# Patient Record
Sex: MALE | URBAN - METROPOLITAN AREA
[De-identification: names, ages, dates, MRNs, and addresses within clinical notes are randomized per-mention and may not be internally consistent; named-entity substitution may affect disease eponyms.]

---

## 2020-06-17 DIAGNOSIS — Z20.822 SUSPECTED COVID-19 VIRUS INFECTION: ICD-10-CM

## 2020-07-07 ENCOUNTER — OFFICE VISIT (OUTPATIENT)
Dept: URGENT CARE | Facility: CLINIC | Age: 55
End: 2020-07-07
Payer: COMMERCIAL

## 2020-07-07 VITALS
WEIGHT: 220 LBS | SYSTOLIC BLOOD PRESSURE: 184 MMHG | HEIGHT: 69 IN | DIASTOLIC BLOOD PRESSURE: 99 MMHG | HEART RATE: 85 BPM | RESPIRATION RATE: 16 BRPM | TEMPERATURE: 97 F | OXYGEN SATURATION: 97 % | BODY MASS INDEX: 32.58 KG/M2

## 2020-07-07 DIAGNOSIS — Z20.822 CLOSE EXPOSURE TO COVID-19 VIRUS: Primary | ICD-10-CM

## 2020-07-07 PROCEDURE — 99203 PR OFFICE/OUTPT VISIT, NEW, LEVL III, 30-44 MIN: ICD-10-PCS | Mod: S$GLB,,, | Performed by: NURSE PRACTITIONER

## 2020-07-07 PROCEDURE — U0003 INFECTIOUS AGENT DETECTION BY NUCLEIC ACID (DNA OR RNA); SEVERE ACUTE RESPIRATORY SYNDROME CORONAVIRUS 2 (SARS-COV-2) (CORONAVIRUS DISEASE [COVID-19]), AMPLIFIED PROBE TECHNIQUE, MAKING USE OF HIGH THROUGHPUT TECHNOLOGIES AS DESCRIBED BY CMS-2020-01-R: HCPCS

## 2020-07-07 PROCEDURE — 99203 OFFICE O/P NEW LOW 30 MIN: CPT | Mod: S$GLB,,, | Performed by: NURSE PRACTITIONER

## 2020-07-07 NOTE — PROGRESS NOTES
"Subjective:       Patient ID: Harvey Tracy is a 55 y.o. male.    Vitals:  height is 5' 9" (1.753 m) and weight is 99.8 kg (220 lb). His oral temperature is 97.1 °F (36.2 °C). His blood pressure is 184/99 (abnormal) and his pulse is 85. His respiration is 16 and oxygen saturation is 97%.     Chief Complaint: COVID-19 Concerns and Sore Throat    On rig, exposed to 2-3 people who had symptoms and now have tested + for covid, same sleeping quarters.    Sore Throat   This is a new problem. Episode onset: 3-4 days. The problem has been unchanged. Neither side of throat is experiencing more pain than the other. There has been no fever. The pain is at a severity of 4/10. The pain is mild. Associated symptoms include headaches (Frontal, nonradiating, mild in intensity). Pertinent negatives include no abdominal pain, congestion, coughing, diarrhea, drooling, ear discharge, ear pain, hoarse voice, plugged ear sensation, neck pain, shortness of breath, stridor, swollen glands, trouble swallowing or vomiting. He has had no exposure to strep or mono. He has tried nothing for the symptoms.       Constitution: Negative for chills, sweating, fatigue and fever.        No loss of taste and smell.   HENT: Positive for sore throat. Negative for ear pain, ear discharge, drooling, congestion, sinus pain, sinus pressure, trouble swallowing and voice change.    Neck: Negative for neck pain, neck stiffness and painful lymph nodes.   Cardiovascular: Negative for chest pain and sob on exertion.   Eyes: Negative for eye discharge, eye itching and eye redness.   Respiratory: Negative for chest tightness, cough, sputum production, bloody sputum, COPD, shortness of breath, stridor, wheezing and asthma.    Gastrointestinal: Negative for abdominal pain, nausea, vomiting and diarrhea.   Genitourinary: Negative for dysuria, frequency, urgency, urine decreased, flank pain, hematuria and history of kidney stones.   Musculoskeletal: Positive for " muscle ache. Negative for joint pain and joint swelling.   Skin: Negative for rash and erythema.   Allergic/Immunologic: Negative for seasonal allergies and asthma.   Neurological: Positive for headaches (Frontal, nonradiating, mild in intensity). Negative for light-headedness.   Hematologic/Lymphatic: Negative for swollen lymph nodes.       Objective:      Physical Exam   Constitutional: He is oriented to person, place, and time.  Non-toxic appearance. He does not appear ill. No distress.   HENT:   Head: Normocephalic.   Right Ear: External ear normal.   Left Ear: External ear normal.   Nose: Nose normal.   Mouth/Throat: Mucous membranes are moist. Posterior oropharyngeal erythema (Mild clear postnasal drainage) present. No oropharyngeal exudate.      Comments: Airway patent.  Normal phonation.  Symmetrical soft palate elevation.  No trismus or difficulty tolerating oral secretions.  Eyes: Conjunctivae are normal. Right eye exhibits no discharge. Left eye exhibits no discharge. No scleral icterus.   Neck: Normal range of motion. Neck supple. No neck rigidity.   Cardiovascular: Normal rate.   Pulmonary/Chest: Effort normal. No respiratory distress.    Comments: Normal respiratory rate, room air saturations.  Speaking in full sentences without difficulty.  No notable coughing.  No audible wheezing.    Abdominal: Normal appearance. There is no abdominal tenderness ( per patient).   Musculoskeletal: Normal range of motion.      Right lower leg: No edema.      Left lower leg: No edema.   Lymphadenopathy:     He has no cervical adenopathy.   Neurological: He is alert and oriented to person, place, and time. Coordination normal.   Skin: Skin is warm, dry, not diaphoretic and not pale. erythema  Psychiatric: His behavior is normal. Mood normal.   Nursing note and vitals reviewed.      Each patient to whom I provide medical services by telemedicine is:  (1) informed of the relationship between the physician and patient and  the respective role of any other health care provider with respect to management of the patient; and (2) notified that they may decline to receive medical services by telemedicine and may withdraw from such care at any time. Patient verbally consented to receive this service via voice-only telephone call.       This service was not originating from a related E/M service provided within the previous 7 days nor will  to an E/M service or procedure within the next 24 hours or my soonest available appointment.  Prevailing standard of care was able to be met in this audio and 5 min face to face visit with appropriate ppe.     Assessment:       1. Close Exposure to Covid-19 Virus        Plan:     Alert, nontoxic and in NAD. Afebrile.  Patient with no evidence of respiratory distress.  Patient with viral syndrome symptoms.  Will test for COVID.  Advised on COVID testing, signs and symptoms of COVID, symptomatic management at home, signs and symptoms to seek emergency care, CDC quarantine guidelines for COVID.  Patient verbalized understanding and agreement treatment plan.      Close Exposure to Covid-19 Virus  -     COVID-19 Routine Screening         Patient Instructions     Elevated Blood Pressure  Your blood pressure was elevated during your visit to the urgent care today.  It was not so high that immediate care was needed, but it is recommended that you monitor your blood pressure over the next week or two to make sure that it is not staying elevated.  If you are on blood pressure medication currently, continue as already prescribed. Please have your blood pressure taken 2-3 times daily at different times of the day.  Keep a log of these blood pressure readings and take it with you to see your Primary Care Physician.  Bring today's discharge papers as well to your follow up appointment. If your blood pressure is consistently above 140/90, you should follow-up with your PCP without delay. If you develop chest  "pain, shortness of breath, dizziness, vision changes, or any other concerning symptoms, you should seek immediate care in the Emergency Department.          Viral Syndrome (Adult)  A viral illness may cause a number of symptoms. The symptoms depend on the part of the body that the virus affects. If it settles in your nose, throat, and lungs, it may cause cough, sore throat, congestion, and sometimes headache. If it settles in your stomach and intestinal tract, it may cause vomiting and diarrhea. Sometimes it causes vague symptoms like "aching all over," feeling tired, loss of appetite, or fever.  A viral illness usually lasts 1 to 2 weeks, but sometimes it lasts longer. In some cases, a more serious infection can look like a viral syndrome in the first few days of the illness. You may need another exam and additional tests to know the difference. Watch for the warning signs listed below.  Home care  Follow these guidelines for taking care of yourself at home:  If symptoms are severe, rest at home for the first 2 to 3 days.  Stay away from cigarette smoke - both your smoke and the smoke from others.  You may use over-the-counter acetaminophen or ibuprofen for fever, muscle aching, and headache, unless another medicine was prescribed for this. If you have chronic liver or kidney disease or ever had a stomach ulcer or GI bleeding, talk with your doctor before using these medicines. No one who is younger than 18 and ill with a fever should take aspirin. It may cause severe disease or death.  Your appetite may be poor, so a light diet is fine. Avoid dehydration by drinking 8 to 12 8-ounce glasses of fluids each day. This may include water; orange juice; lemonade; apple, grape, and cranberry juice; clear fruit drinks; electrolyte replacement and sports drinks; and decaffeinated teas and coffee. If you have been diagnosed with a kidney disease, ask your doctor how much and what types of fluids you should drink to prevent " dehydration. If you have kidney disease, drinking too much fluid can cause it build up in the your body and be dangerous to your health.  Over-the-counter remedies won't shorten the length of the illness but may be helpful for cough, sore throat; and nasal and sinus congestion. Don't use decongestants if you have high blood pressure.  Follow-up care  Follow up with your healthcare provider if you do not improve over the next week.  Call 911  Get emergency medical care if any of the following occur:  Convulsion  Feeling weak, dizzy, or like you are going to faint  Chest pain, shortness of breath, wheezing, or difficulty breathing  When to seek medical advice  Call your healthcare provider right away if any of these occur:  Cough with lots of colored sputum (mucus) or blood in your sputum  Chest pain, shortness of breath, wheezing, or difficulty breathing  Severe headache; face, neck, or ear pain  Severe, constant pain in the lower right side of your belly (abdominal)  Continued vomiting (cant keep liquids down)  Frequent diarrhea (more than 5 times a day); blood (red or black color) or mucus in diarrhea  Feeling weak, dizzy, or like you are going to faint  Extreme thirst  Fever of 100.4°F (38°C) or higher, or as directed by your healthcare provider  Date Last Reviewed: 9/25/2015  © 9271-2639 [employer]. [address] [place] [address]. All rights reserved. This information is not intended as a substitute for professional medical care. Always follow your healthcare professional's instructions.      Instructions for Patients with Confirmed or Suspected COVID-19    If you are awaiting your test result, you will either be called or it will be released to the patient portal.  If you have any questions about your test, please visit [email or web address] or call our COVID-19 information line at [phone].      Preventing the Spread of Coronavirus Disease 2019 (COVID-19) in Homes and  Residential Communities -- Patients     Prevention steps for people with confirmed or suspected COVID-19 (including persons under investigation) who do not need to be hospitalized and people with confirmed COVID-19 who were hospitalized and determined to be medically stable to go home.     Stay home except to get medical care.   Separate yourself from other people and animals in your home.   Call ahead before visiting your doctor.   Wear a face mask.   Cover your coughs and sneezes.   Clean your hands often.   Avoid sharing personal household items.   Clean all high-touch surfaces every day.   Monitor your symptoms. Seek prompt medical attention if your illness is worsening (e.g., difficulty breathing). Before seeking care, call your healthcare provider.   If you have a medical emergency and must call 911, notify the dispatcher that you have or are being evaluated for COVID-19. If possible, put on a face mask before emergency medical services arrive.   Use the following symptom-based strategy to return to normal activity following a suspected or confirmed case of COVID-19. Continue isolation until:   At least 3 days (72 hours) have passed since recovery defined as resolution of fever without the use of fever-reducing medications and improvement in respiratory symptoms (e.g. cough, shortness of breath), and   At least 10 days have passed since symptoms first appeared.     Precautions for household members, intimate partners and caregivers in a non-healthcare setting of a patient with symptomatic laboratory-confirmed COVID-19 or a patient under investigation.     Household members, intimate partners and caregivers in a non-healthcare setting may have close contact with a person with symptomatic, laboratory-confirmed COVID-19 or a person under investigation. Close contacts should monitor their health; they should call their healthcare provider right away if they develop symptoms suggestive of COVID-19 (e.g., fever,  cough, shortness of breath). Close contacts should also follow these recommendations:     Stay home for the duration of the time recommended by healthcare provider, except to get medical care. Separate yourself from other people and animals in the home.  Monitor the patients symptoms. If the patient is getting sicker, call his or her healthcare provider. If the patient has a medical emergency and you need to call 911, notify the dispatch personnel that the patient has or is being evaluated for COVID-19.   Wear a facemask when around other people such as sharing a room or vehicle and before entering a healthcare provider's office.  Cover coughs and sneezes with a tissue. Throw used tissues in a lined trash can immediately and wash hands.  Clean hands often with soap and water for at least 20 seconds or with an alcohol-based hand , rubbing hands together until they feel dry. Avoid touching your eyes, nose, and mouth with unwashed hands.  Clean all high-touch; surfaces every day, including counters, tabletops, doorknobs, bathroom fixtures, toilets, phones, keyboards, tablets, bedside tables, etc. Use a household cleaning spray or wipe according to label instructions.  Avoid sharing personal household items such as dishes, drinking glasses, cups, towels, bedding, etc. After these items are used, they should be washed thoroughly with soap and water.  Use the following symptom-based strategy to return to normal activity following a suspected or confirmed case of COVID-19. Continue isolation until:   At least 3 days (72 hours) have passed since recovery defined as resolution of fever without the use of fever-reducing medications and improvement in respiratory symptoms (e.g. cough, shortness of breath), and   At least 10 days have passed since symptoms first appeared.  ·   ·   · Follow up with your primary care in 2-5 days if symptoms have not improved, or you may return here.  · If you were referred to a  specialist, please follow up with that specialty.  · If you were prescribed antibiotics, please take them to completion.  · If you were prescribed a narcotic or any medication with sedative effects, do not drive or operate heavy equipment or machinery while taking these medications.  · You must understand that you have received treatment at an Urgent Care facility only, and that you may be released before all of your medical problems are known or treated. Urgent Care facilities are not equipped to handle life threatening emergencies. It is recommended that you go to an Emergency Department for further evaluation of worsening or concerning symptoms, or possibly life threatening conditions as discussed.                                        If you  smoke, please stop smoking

## 2020-07-07 NOTE — LETTER
[address] [facility] [address] ? [place], [address] ? Phone [phone] ? Fax [phone]           Return to Work Status    Patient: Harvey Tracy  YOB: 1965   Date: 07/07/2020      [facility] has adopted CDCs time-based return to work strategy for persons with confirmed or suspected COVID19. [facility] does not recommend using a test-based strategy for returning to work after COVID-19 infection. [facility] has reported prolonged positive test results without evidence of infectiousness. At this time, positive specimens capable of producing disease have not been isolated more than 9 days after onset of illness.   Symptomatic persons with confirmed COVID-19 or suspected COVID-19 can return to work after:    At least 3 days (72 hours) have passed since recovery defined as resolution of fever without the use of fever-reducing medications AND improvement in respiratory symptoms (e.g., cough, shortness of breath)    AND, at least 10 days have passed since symptoms first appeared  Asymptomatic persons with confirmed COVID-19 can return to work after:   At least 10 days have passed since the positive laboratory test and the person remains asymptomatic.  More information about the science behind the symptom-based return to work can be found at: https://www.cdc.gov/coronavirus/2019-ncov/community/gaubxzhp-fgcpukbnpda-nybglejmo.html    Sincerely,    Nu Miller NP

## 2020-07-07 NOTE — PATIENT INSTRUCTIONS
"  Elevated Blood Pressure  Your blood pressure was elevated during your visit to the urgent care today.  It was not so high that immediate care was needed, but it is recommended that you monitor your blood pressure over the next week or two to make sure that it is not staying elevated.  If you are on blood pressure medication currently, continue as already prescribed. Please have your blood pressure taken 2-3 times daily at different times of the day.  Keep a log of these blood pressure readings and take it with you to see your Primary Care Physician.  Bring today's discharge papers as well to your follow up appointment. If your blood pressure is consistently above 140/90, you should follow-up with your PCP without delay. If you develop chest pain, shortness of breath, dizziness, vision changes, or any other concerning symptoms, you should seek immediate care in the Emergency Department.          Viral Syndrome (Adult)  A viral illness may cause a number of symptoms. The symptoms depend on the part of the body that the virus affects. If it settles in your nose, throat, and lungs, it may cause cough, sore throat, congestion, and sometimes headache. If it settles in your stomach and intestinal tract, it may cause vomiting and diarrhea. Sometimes it causes vague symptoms like "aching all over," feeling tired, loss of appetite, or fever.  A viral illness usually lasts 1 to 2 weeks, but sometimes it lasts longer. In some cases, a more serious infection can look like a viral syndrome in the first few days of the illness. You may need another exam and additional tests to know the difference. Watch for the warning signs listed below.  Home care  Follow these guidelines for taking care of yourself at home:  If symptoms are severe, rest at home for the first 2 to 3 days.  Stay away from cigarette smoke - both your smoke and the smoke from others.  You may use over-the-counter acetaminophen or ibuprofen for fever, muscle " aching, and headache, unless another medicine was prescribed for this. If you have chronic liver or kidney disease or ever had a stomach ulcer or GI bleeding, talk with your doctor before using these medicines. No one who is younger than 18 and ill with a fever should take aspirin. It may cause severe disease or death.  Your appetite may be poor, so a light diet is fine. Avoid dehydration by drinking 8 to 12 8-ounce glasses of fluids each day. This may include water; orange juice; lemonade; apple, grape, and cranberry juice; clear fruit drinks; electrolyte replacement and sports drinks; and decaffeinated teas and coffee. If you have been diagnosed with a kidney disease, ask your doctor how much and what types of fluids you should drink to prevent dehydration. If you have kidney disease, drinking too much fluid can cause it build up in the your body and be dangerous to your health.  Over-the-counter remedies won't shorten the length of the illness but may be helpful for cough, sore throat; and nasal and sinus congestion. Don't use decongestants if you have high blood pressure.  Follow-up care  Follow up with your healthcare provider if you do not improve over the next week.  Call 911  Get emergency medical care if any of the following occur:  Convulsion  Feeling weak, dizzy, or like you are going to faint  Chest pain, shortness of breath, wheezing, or difficulty breathing  When to seek medical advice  Call your healthcare provider right away if any of these occur:  Cough with lots of colored sputum (mucus) or blood in your sputum  Chest pain, shortness of breath, wheezing, or difficulty breathing  Severe headache; face, neck, or ear pain  Severe, constant pain in the lower right side of your belly (abdominal)  Continued vomiting (cant keep liquids down)  Frequent diarrhea (more than 5 times a day); blood (red or black color) or mucus in diarrhea  Feeling weak, dizzy, or like you are going to faint  Extreme  thirst  Fever of 100.4°F (38°C) or higher, or as directed by your healthcare provider  Date Last Reviewed: 9/25/2015 © 2000-2017 [employer] [address] [place] [address], [place] [address]. All rights reserved. This information is not intended as a substitute for professional medical care. Always follow your healthcare professional's instructions.      Instructions for Patients with Confirmed or Suspected COVID-19    If you are awaiting your test result, you will either be called or it will be released to the patient portal.  If you have any questions about your test, please visit [email or web address] or call our COVID-19 information line at [phone].      Preventing the Spread of Coronavirus Disease 2019 (COVID-19) in Homes and Residential Communities -- Patients     Prevention steps for people with confirmed or suspected COVID-19 (including persons under investigation) who do not need to be hospitalized and people with confirmed COVID-19 who were hospitalized and determined to be medically stable to go home.     Stay home except to get medical care.   Separate yourself from other people and animals in your home.   Call ahead before visiting your doctor.   Wear a face mask.   Cover your coughs and sneezes.   Clean your hands often.   Avoid sharing personal household items.   Clean all high-touch surfaces every day.   Monitor your symptoms. Seek prompt medical attention if your illness is worsening (e.g., difficulty breathing). Before seeking care, call your healthcare provider.   If you have a medical emergency and must call 911, notify the dispatcher that you have or are being evaluated for COVID-19. If possible, put on a face mask before emergency medical services arrive.   Use the following symptom-based strategy to return to normal activity following a suspected or confirmed case of COVID-19. Continue isolation until:   At least 3 days (72 hours) have passed since recovery defined as  resolution of fever without the use of fever-reducing medications and improvement in respiratory symptoms (e.g. cough, shortness of breath), and   At least 10 days have passed since symptoms first appeared.     Precautions for household members, intimate partners and caregivers in a non-healthcare setting of a patient with symptomatic laboratory-confirmed COVID-19 or a patient under investigation.     Household members, intimate partners and caregivers in a non-healthcare setting may have close contact with a person with symptomatic, laboratory-confirmed COVID-19 or a person under investigation. Close contacts should monitor their health; they should call their healthcare provider right away if they develop symptoms suggestive of COVID-19 (e.g., fever, cough, shortness of breath). Close contacts should also follow these recommendations:     Stay home for the duration of the time recommended by healthcare provider, except to get medical care. Separate yourself from other people and animals in the home.  Monitor the patients symptoms. If the patient is getting sicker, call his or her healthcare provider. If the patient has a medical emergency and you need to call 911, notify the dispatch personnel that the patient has or is being evaluated for COVID-19.   Wear a facemask when around other people such as sharing a room or vehicle and before entering a healthcare provider's office.  Cover coughs and sneezes with a tissue. Throw used tissues in a lined trash can immediately and wash hands.  Clean hands often with soap and water for at least 20 seconds or with an alcohol-based hand , rubbing hands together until they feel dry. Avoid touching your eyes, nose, and mouth with unwashed hands.  Clean all high-touch; surfaces every day, including counters, tabletops, doorknobs, bathroom fixtures, toilets, phones, keyboards, tablets, bedside tables, etc. Use a household cleaning spray or wipe according to label  instructions.  Avoid sharing personal household items such as dishes, drinking glasses, cups, towels, bedding, etc. After these items are used, they should be washed thoroughly with soap and water.  Use the following symptom-based strategy to return to normal activity following a suspected or confirmed case of COVID-19. Continue isolation until:   At least 3 days (72 hours) have passed since recovery defined as resolution of fever without the use of fever-reducing medications and improvement in respiratory symptoms (e.g. cough, shortness of breath), and   At least 10 days have passed since symptoms first appeared.  ·   ·   · Follow up with your primary care in 2-5 days if symptoms have not improved, or you may return here.  · If you were referred to a specialist, please follow up with that specialty.  · If you were prescribed antibiotics, please take them to completion.  · If you were prescribed a narcotic or any medication with sedative effects, do not drive or operate heavy equipment or machinery while taking these medications.  · You must understand that you have received treatment at an Urgent Care facility only, and that you may be released before all of your medical problems are known or treated. Urgent Care facilities are not equipped to handle life threatening emergencies. It is recommended that you go to an Emergency Department for further evaluation of worsening or concerning symptoms, or possibly life threatening conditions as discussed.                                        If you  smoke, please stop smoking

## 2020-07-10 LAB — SARS-COV-2 RNA RESP QL NAA+PROBE: NOT DETECTED

## 2020-07-11 ENCOUNTER — TELEPHONE (OUTPATIENT)
Dept: URGENT CARE | Facility: CLINIC | Age: 55
End: 2020-07-11

## 2020-07-11 NOTE — TELEPHONE ENCOUNTER
Called patient back for follow up visit 4 days ago for Covid testing.  Gave patient NEGATIVE test result.  Patient currently asymptomatic.  Recommended return to normal activity, follow up with us as needed.